# Patient Record
Sex: MALE | Race: BLACK OR AFRICAN AMERICAN | NOT HISPANIC OR LATINO | Employment: OTHER | URBAN - METROPOLITAN AREA
[De-identification: names, ages, dates, MRNs, and addresses within clinical notes are randomized per-mention and may not be internally consistent; named-entity substitution may affect disease eponyms.]

---

## 2023-03-10 DIAGNOSIS — M47.26 OSTEOARTHRITIS OF SPINE WITH RADICULOPATHY, LUMBAR REGION: Primary | ICD-10-CM

## 2023-03-10 RX ORDER — PREGABALIN 50 MG/1
50 CAPSULE ORAL 2 TIMES DAILY
Qty: 60 CAPSULE | Refills: 3 | Status: SHIPPED | OUTPATIENT
Start: 2023-03-10 | End: 2023-03-13 | Stop reason: SDUPTHER

## 2023-03-13 ENCOUNTER — TELEPHONE (OUTPATIENT)
Dept: PRIMARY CARE | Facility: CLINIC | Age: 63
End: 2023-03-13
Payer: COMMERCIAL

## 2023-03-13 RX ORDER — PREGABALIN 50 MG/1
50 CAPSULE ORAL 2 TIMES DAILY
Qty: 60 CAPSULE | Refills: 3 | Status: SHIPPED | OUTPATIENT
Start: 2023-03-13 | End: 2023-06-06

## 2023-03-28 DIAGNOSIS — M48.062 LUMBAR STENOSIS WITH NEUROGENIC CLAUDICATION: Primary | ICD-10-CM

## 2023-03-28 RX ORDER — OXYCODONE AND ACETAMINOPHEN 10; 325 MG/1; MG/1
1 TABLET ORAL 3 TIMES DAILY PRN
COMMUNITY
End: 2023-03-28 | Stop reason: SDUPTHER

## 2023-03-30 RX ORDER — OXYCODONE AND ACETAMINOPHEN 10; 325 MG/1; MG/1
1 TABLET ORAL 3 TIMES DAILY PRN
Qty: 90 TABLET | Refills: 0 | Status: SHIPPED | OUTPATIENT
Start: 2023-03-30 | End: 2023-05-02 | Stop reason: SDUPTHER

## 2023-04-12 PROBLEM — H01.003 BLEPHARITIS OF RIGHT EYE: Status: ACTIVE | Noted: 2023-04-12

## 2023-04-12 PROBLEM — I95.9 HYPOTENSION: Status: ACTIVE | Noted: 2023-04-12

## 2023-04-12 PROBLEM — K76.0 FATTY LIVER: Status: ACTIVE | Noted: 2023-04-12

## 2023-04-12 PROBLEM — I73.9 INTERMITTENT CLAUDICATION (CMS-HCC): Status: ACTIVE | Noted: 2023-04-12

## 2023-04-12 PROBLEM — K21.9 GERD (GASTROESOPHAGEAL REFLUX DISEASE): Status: ACTIVE | Noted: 2023-04-12

## 2023-04-12 PROBLEM — R26.2 INABILITY TO AMBULATE DUE TO MULTIPLE JOINTS: Status: ACTIVE | Noted: 2023-04-12

## 2023-04-12 PROBLEM — H25.11 AGE-RELATED NUCLEAR CATARACT OF RIGHT EYE: Status: ACTIVE | Noted: 2023-04-12

## 2023-04-12 PROBLEM — F41.9 ANXIETY: Status: ACTIVE | Noted: 2023-04-12

## 2023-04-12 PROBLEM — R09.81 SINUS CONGESTION: Status: ACTIVE | Noted: 2023-04-12

## 2023-04-12 PROBLEM — R79.89 ABNORMAL LFTS: Status: ACTIVE | Noted: 2023-04-12
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
## 2023-04-12 PROBLEM — N32.81 OVERACTIVE BLADDER: Status: ACTIVE | Noted: 2023-04-12

## 2023-04-12 PROBLEM — I89.8 LYMPHOCELE: Status: ACTIVE | Noted: 2023-04-12

## 2023-04-12 PROBLEM — D84.9 IMMUNOSUPPRESSION (MULTI): Status: ACTIVE | Noted: 2023-04-12

## 2023-04-12 PROBLEM — R68.89 FLU-LIKE SYMPTOMS: Status: ACTIVE | Noted: 2023-04-12

## 2023-04-12 PROBLEM — K04.7 DENTAL INFECTION: Status: ACTIVE | Noted: 2023-04-12

## 2023-04-12 PROBLEM — E78.5 DYSLIPIDEMIA: Status: ACTIVE | Noted: 2023-04-12

## 2023-04-12 PROBLEM — E21.3: Status: ACTIVE | Noted: 2023-04-12

## 2023-04-12 PROBLEM — K80.20 CHOLELITHIASIS: Status: ACTIVE | Noted: 2023-04-12

## 2023-04-12 PROBLEM — L02.222 BOIL, BACK: Status: ACTIVE | Noted: 2023-04-12

## 2023-04-12 PROBLEM — K92.1 BLOOD IN STOOL: Status: ACTIVE | Noted: 2023-04-12

## 2023-04-12 PROBLEM — K63.5 COLON POLYP: Status: ACTIVE | Noted: 2023-04-12

## 2023-04-12 PROBLEM — N52.9 INABILITY TO ATTAIN ERECTION: Status: ACTIVE | Noted: 2023-04-12

## 2023-04-12 PROBLEM — M62.81 MUSCLE WEAKNESS (GENERALIZED): Status: ACTIVE | Noted: 2023-04-12

## 2023-04-12 PROBLEM — M16.0 ARTHRITIS OF BOTH HIPS: Status: ACTIVE | Noted: 2023-04-12

## 2023-04-12 PROBLEM — I25.10 ARTERIOSCLEROSIS OF CORONARY ARTERY: Status: ACTIVE | Noted: 2023-04-12

## 2023-04-12 PROBLEM — M89.9 LYTIC LESION OF BONE ON X-RAY: Status: ACTIVE | Noted: 2023-04-12

## 2023-04-12 PROBLEM — H25.12 AGE-RELATED NUCLEAR CATARACT OF LEFT EYE: Status: ACTIVE | Noted: 2023-04-12

## 2023-04-12 PROBLEM — E11.649 DIABETIC HYPOGLYCEMIA (MULTI): Status: ACTIVE | Noted: 2023-04-12

## 2023-04-12 PROBLEM — K59.09 CHRONIC CONSTIPATION: Status: ACTIVE | Noted: 2023-04-12

## 2023-04-12 PROBLEM — L08.9 INFECTED SEBACEOUS CYST OF SKIN: Status: ACTIVE | Noted: 2023-04-12

## 2023-04-12 PROBLEM — I50.30 (HFPEF) HEART FAILURE WITH PRESERVED EJECTION FRACTION (MULTI): Status: ACTIVE | Noted: 2023-04-12

## 2023-04-12 PROBLEM — B37.9 CANDIDIASIS: Status: ACTIVE | Noted: 2023-04-12

## 2023-04-12 PROBLEM — M79.89 FINGER SWELLING: Status: ACTIVE | Noted: 2023-04-12
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
## 2023-04-12 PROBLEM — M19.019 SHOULDER ARTHRITIS: Status: ACTIVE | Noted: 2023-04-12

## 2023-04-12 PROBLEM — M79.643 HAND DISCOMFORT: Status: ACTIVE | Noted: 2023-04-12

## 2023-04-12 PROBLEM — R10.13 ABDOMINAL PAIN, EPIGASTRIC: Status: ACTIVE | Noted: 2023-04-12

## 2023-04-12 PROBLEM — I73.9 PAD (PERIPHERAL ARTERY DISEASE) (CMS-HCC): Status: ACTIVE | Noted: 2023-04-12

## 2023-04-12 PROBLEM — M25.551 RIGHT HIP PAIN: Status: ACTIVE | Noted: 2023-04-12

## 2023-04-12 PROBLEM — N25.0 RENAL OSTEODYSTROPHY: Status: ACTIVE | Noted: 2023-04-12

## 2023-04-12 PROBLEM — A08.4 VIRAL GASTROENTERITIS: Status: ACTIVE | Noted: 2023-04-12

## 2023-04-12 PROBLEM — L97.929 LEG ULCER, LEFT (MULTI): Status: ACTIVE | Noted: 2023-04-12

## 2023-04-12 PROBLEM — M48.061 LUMBAR SPINAL STENOSIS: Status: ACTIVE | Noted: 2023-04-12

## 2023-04-12 PROBLEM — R60.0 BILATERAL EDEMA OF LOWER EXTREMITY: Status: ACTIVE | Noted: 2023-04-12

## 2023-04-12 PROBLEM — M54.32 SCIATICA OF LEFT SIDE: Status: ACTIVE | Noted: 2023-04-12

## 2023-04-12 PROBLEM — R13.10 DIFFICULTY SWALLOWING: Status: ACTIVE | Noted: 2023-04-12

## 2023-04-12 PROBLEM — E87.5 HYPERKALEMIA: Status: ACTIVE | Noted: 2023-04-12

## 2023-04-12 PROBLEM — K92.2 GI BLEEDING: Status: ACTIVE | Noted: 2023-04-12

## 2023-04-12 PROBLEM — N40.0 BPH (BENIGN PROSTATIC HYPERPLASIA): Status: ACTIVE | Noted: 2023-04-12

## 2023-04-12 PROBLEM — E86.0 DEHYDRATION: Status: ACTIVE | Noted: 2023-04-12

## 2023-04-12 PROBLEM — N18.4 CHRONIC RENAL IMPAIRMENT, STAGE 4 (SEVERE) (MULTI): Status: ACTIVE | Noted: 2023-04-12

## 2023-04-12 PROBLEM — M19.90 OSTEOARTHRITIS: Status: ACTIVE | Noted: 2023-04-12

## 2023-04-12 PROBLEM — H10.13 ALLERGIC CONJUNCTIVITIS, BILATERAL: Status: ACTIVE | Noted: 2023-04-12

## 2023-04-12 PROBLEM — D12.6 TUBULAR ADENOMA OF COLON: Status: ACTIVE | Noted: 2023-04-12

## 2023-04-12 PROBLEM — G47.30 SLEEP APNEA: Status: ACTIVE | Noted: 2023-04-12

## 2023-04-12 PROBLEM — N39.0 RECURRENT UTI: Status: ACTIVE | Noted: 2023-04-12

## 2023-04-12 PROBLEM — I65.29 CAROTID ATHEROSCLEROSIS: Status: ACTIVE | Noted: 2023-04-12

## 2023-04-12 PROBLEM — R26.0 ATAXIC GAIT: Status: ACTIVE | Noted: 2023-04-12

## 2023-04-12 PROBLEM — M51.36 DEGENERATIVE LUMBAR DISC: Status: ACTIVE | Noted: 2023-04-12

## 2023-04-12 PROBLEM — Z99.2 ESRD ON DIALYSIS (MULTI): Status: ACTIVE | Noted: 2023-04-12

## 2023-04-12 PROBLEM — I50.30 DIASTOLIC HEART FAILURE (MULTI): Status: ACTIVE | Noted: 2023-04-12

## 2023-04-12 PROBLEM — H43.813 DEGENERATION OF POSTERIOR VITREOUS BODY OF BOTH EYES: Status: ACTIVE | Noted: 2023-04-12

## 2023-04-12 PROBLEM — M25.551 PAIN OF BOTH HIP JOINTS: Status: ACTIVE | Noted: 2023-04-12

## 2023-04-12 PROBLEM — T82.9XXA COMPLICATION OF ARTERIOVENOUS DIALYSIS FISTULA: Status: ACTIVE | Noted: 2023-04-12

## 2023-04-12 PROBLEM — E11.9 TYPE 2 DIABETES MELLITUS (MULTI): Status: ACTIVE | Noted: 2023-04-12

## 2023-04-12 PROBLEM — A04.72 CLOSTRIDIUM DIFFICILE ENTERITIS: Status: ACTIVE | Noted: 2023-04-12

## 2023-04-12 PROBLEM — F32.A DEPRESSION: Status: ACTIVE | Noted: 2023-04-12

## 2023-04-12 PROBLEM — M51.369 DEGENERATIVE LUMBAR DISC: Status: ACTIVE | Noted: 2023-04-12

## 2023-04-12 PROBLEM — R26.89 IMPAIRED GAIT AND MOBILITY: Status: ACTIVE | Noted: 2023-04-12

## 2023-04-12 PROBLEM — J45.909 ASTHMA (HHS-HCC): Status: ACTIVE | Noted: 2023-04-12

## 2023-04-12 PROBLEM — S91.309A NONHEALING WOUND OF HEEL: Status: ACTIVE | Noted: 2023-04-12

## 2023-04-12 PROBLEM — R05.9 COUGH: Status: ACTIVE | Noted: 2023-04-12

## 2023-04-12 PROBLEM — Z98.84 S/P GASTRIC BYPASS: Status: ACTIVE | Noted: 2023-04-12

## 2023-04-12 PROBLEM — D69.6 THROMBOCYTOPENIA (CMS-HCC): Status: ACTIVE | Noted: 2023-04-12

## 2023-04-12 PROBLEM — R19.7 DIARRHEA: Status: ACTIVE | Noted: 2023-04-12

## 2023-04-12 PROBLEM — L97.421: Status: ACTIVE | Noted: 2023-04-12

## 2023-04-12 PROBLEM — I47.10 PAROXYSMAL SVT (SUPRAVENTRICULAR TACHYCARDIA) (CMS-HCC): Status: ACTIVE | Noted: 2023-04-12

## 2023-04-12 PROBLEM — H01.006 BLEPHARITIS OF LEFT EYE: Status: ACTIVE | Noted: 2023-04-12

## 2023-04-12 PROBLEM — N39.0 URINARY TRACT INFECTION: Status: ACTIVE | Noted: 2023-04-12

## 2023-04-12 PROBLEM — M17.0 ARTHRITIS OF BOTH KNEES: Status: ACTIVE | Noted: 2023-04-12

## 2023-04-12 PROBLEM — N18.6 ESRD ON DIALYSIS (MULTI): Status: ACTIVE | Noted: 2023-04-12

## 2023-04-12 PROBLEM — R11.2 NAUSEA AND/OR VOMITING: Status: ACTIVE | Noted: 2023-04-12

## 2023-04-12 PROBLEM — M70.60 TROCHANTERIC BURSITIS: Status: ACTIVE | Noted: 2023-04-12

## 2023-04-12 PROBLEM — L72.3 INFECTED SEBACEOUS CYST OF SKIN: Status: ACTIVE | Noted: 2023-04-12

## 2023-04-12 PROBLEM — G62.9 PERIPHERAL NEUROPATHY: Status: ACTIVE | Noted: 2023-04-12

## 2023-04-12 PROBLEM — E11.3599 PROLIFERATIVE DIABETIC RETINOPATHY (MULTI): Status: ACTIVE | Noted: 2023-04-12

## 2023-04-12 PROBLEM — M54.50 LOW BACK PAIN: Status: ACTIVE | Noted: 2023-04-12

## 2023-04-12 PROBLEM — R73.9 HYPERGLYCEMIA: Status: ACTIVE | Noted: 2023-04-12

## 2023-04-12 PROBLEM — I50.9 CHF (CONGESTIVE HEART FAILURE) (MULTI): Status: ACTIVE | Noted: 2023-04-12

## 2023-04-12 PROBLEM — M16.11 ARTHRITIS OF RIGHT HIP: Status: ACTIVE | Noted: 2023-04-12

## 2023-04-12 PROBLEM — R11.0 NAUSEA: Status: ACTIVE | Noted: 2023-04-12

## 2023-04-12 PROBLEM — I35.0 AORTIC STENOSIS, MODERATE: Status: ACTIVE | Noted: 2023-04-12

## 2023-04-12 PROBLEM — T79.2XXA SEROMA DUE TO TRAUMA (CMS-HCC): Status: ACTIVE | Noted: 2023-04-12

## 2023-04-12 PROBLEM — G47.33 OSA ON CPAP: Status: ACTIVE | Noted: 2023-04-12

## 2023-04-12 PROBLEM — R33.9 URINARY RETENTION: Status: ACTIVE | Noted: 2023-04-12

## 2023-04-12 PROBLEM — J30.2 SEASONAL ALLERGIES: Status: ACTIVE | Noted: 2023-04-12

## 2023-04-12 PROBLEM — T14.8XXA WOUND, OPEN: Status: ACTIVE | Noted: 2023-04-12

## 2023-04-12 PROBLEM — I10 HYPERTENSION: Status: ACTIVE | Noted: 2023-04-12

## 2023-04-12 PROBLEM — Z86.79 HISTORY OF ATRIAL FIBRILLATION: Status: ACTIVE | Noted: 2023-04-12

## 2023-04-12 PROBLEM — R60.9 DEPENDENT EDEMA: Status: ACTIVE | Noted: 2023-04-12

## 2023-04-12 PROBLEM — M79.644 FINGER PAIN, RIGHT: Status: ACTIVE | Noted: 2023-04-12

## 2023-04-12 PROBLEM — D75.1 POLYCYTHEMIA: Status: ACTIVE | Noted: 2023-04-12

## 2023-04-12 PROBLEM — Z94.0 KIDNEY REPLACED BY TRANSPLANT (HHS-HCC): Status: ACTIVE | Noted: 2023-04-12

## 2023-04-12 PROBLEM — E66.01 MORBID OBESITY (MULTI): Status: ACTIVE | Noted: 2023-04-12

## 2023-04-12 PROBLEM — R31.9 HEMATURIA: Status: ACTIVE | Noted: 2023-04-12

## 2023-04-12 PROBLEM — D47.2 MGUS (MONOCLONAL GAMMOPATHY OF UNKNOWN SIGNIFICANCE): Status: ACTIVE | Noted: 2023-04-12

## 2023-04-12 PROBLEM — M86.9 OSTEOMYELITIS OF FOOT (MULTI): Status: ACTIVE | Noted: 2023-04-12

## 2023-04-12 PROBLEM — N31.9 BLADDER DYSFUNCTION: Status: ACTIVE | Noted: 2023-04-12

## 2023-04-12 PROBLEM — M25.541 PAIN INVOLVING JOINT OF FINGER OF RIGHT HAND: Status: ACTIVE | Noted: 2023-04-12

## 2023-04-12 PROBLEM — R00.2 HEART PALPITATIONS: Status: ACTIVE | Noted: 2023-04-12

## 2023-04-12 PROBLEM — L97.429 ULCER OF LEFT HEEL (MULTI): Status: ACTIVE | Noted: 2023-04-12

## 2023-04-12 PROBLEM — M25.552 PAIN OF BOTH HIP JOINTS: Status: ACTIVE | Noted: 2023-04-12

## 2023-04-12 PROBLEM — N18.9 CRI (CHRONIC RENAL INSUFFICIENCY): Status: ACTIVE | Noted: 2023-04-12

## 2023-04-12 PROBLEM — T86.10: Status: ACTIVE | Noted: 2023-04-12

## 2023-04-12 PROBLEM — H04.123 DRY EYE SYNDROME OF BOTH LACRIMAL GLANDS: Status: ACTIVE | Noted: 2023-04-12

## 2023-04-12 PROBLEM — M10.9 GOUT: Status: ACTIVE | Noted: 2023-04-12

## 2023-04-12 PROBLEM — R03.1 LOW BLOOD PRESSURE, NOT HYPOTENSION: Status: ACTIVE | Noted: 2023-04-12

## 2023-04-12 PROBLEM — E11.40 DIABETES MELLITUS WITH NEUROPATHY (MULTI): Status: ACTIVE | Noted: 2023-04-12

## 2023-04-12 RX ORDER — BLOOD-GLUCOSE METER
EACH MISCELLANEOUS
COMMUNITY
Start: 2015-04-02 | End: 2023-06-21 | Stop reason: SDUPTHER

## 2023-04-12 RX ORDER — FOLIC ACID/VIT B COMPLEX AND C 0.8 MG
TABLET ORAL
COMMUNITY
End: 2023-06-06

## 2023-04-12 RX ORDER — FLUCONAZOLE 100 MG/1
TABLET ORAL
COMMUNITY
Start: 2022-09-01 | End: 2023-06-06 | Stop reason: ALTCHOICE

## 2023-04-12 RX ORDER — TRIAMCINOLONE ACETONIDE 1 MG/G
CREAM TOPICAL
COMMUNITY
Start: 2022-11-05 | End: 2023-06-06

## 2023-04-12 RX ORDER — IBUPROFEN 200 MG/1
TABLET ORAL
COMMUNITY
Start: 2023-02-12

## 2023-04-12 RX ORDER — CEPHALEXIN 250 MG/1
CAPSULE ORAL EVERY 12 HOURS
COMMUNITY
Start: 2022-09-01 | End: 2023-06-06

## 2023-04-12 RX ORDER — DOXYCYCLINE 100 MG/1
1 CAPSULE ORAL 2 TIMES DAILY
COMMUNITY
Start: 2022-10-02 | End: 2023-06-06

## 2023-04-12 RX ORDER — EPINEPHRINE 0.3 MG/.3ML
0.3 INJECTION SUBCUTANEOUS AS NEEDED
COMMUNITY
Start: 2021-06-22 | End: 2023-06-29 | Stop reason: SDUPTHER

## 2023-04-12 RX ORDER — ASPIRIN 81 MG/1
1 TABLET ORAL DAILY
COMMUNITY
Start: 2015-11-05

## 2023-04-12 RX ORDER — APIXABAN 5 MG/1
1 TABLET, FILM COATED ORAL 2 TIMES DAILY
COMMUNITY
Start: 2023-02-12 | End: 2023-06-06

## 2023-04-12 RX ORDER — SEVELAMER CARBONATE 800 MG/1
800 TABLET, FILM COATED ORAL
COMMUNITY

## 2023-04-12 RX ORDER — METOPROLOL TARTRATE 25 MG/1
1 TABLET, FILM COATED ORAL 2 TIMES DAILY
COMMUNITY
Start: 2016-04-05 | End: 2023-08-15 | Stop reason: SDUPTHER

## 2023-04-12 RX ORDER — FOLIC ACID/VIT B COMPLEX AND C 0.8 MG
1 TABLET ORAL DAILY
COMMUNITY

## 2023-04-12 RX ORDER — CLOPIDOGREL BISULFATE 75 MG/1
1 TABLET ORAL DAILY
COMMUNITY

## 2023-04-12 RX ORDER — ASPIRIN 81 MG
2 TABLET, DELAYED RELEASE (ENTERIC COATED) ORAL DAILY
COMMUNITY
Start: 2023-01-19 | End: 2023-06-06 | Stop reason: ALTCHOICE

## 2023-04-12 RX ORDER — ALBUTEROL SULFATE 90 UG/1
2 AEROSOL, METERED RESPIRATORY (INHALATION)
COMMUNITY
Start: 2016-12-15

## 2023-04-12 RX ORDER — ACETAMINOPHEN 325 MG/1
TABLET ORAL
COMMUNITY
End: 2023-06-06

## 2023-04-12 RX ORDER — TORSEMIDE 100 MG/1
TABLET ORAL
COMMUNITY
End: 2023-06-06 | Stop reason: ALTCHOICE

## 2023-04-12 RX ORDER — ERGOCALCIFEROL 1.25 MG/1
CAPSULE ORAL
COMMUNITY
Start: 2021-03-28 | End: 2023-06-06

## 2023-04-12 RX ORDER — PROMETHAZINE HYDROCHLORIDE 25 MG/1
1 TABLET ORAL 3 TIMES DAILY PRN
COMMUNITY
Start: 2021-03-30 | End: 2023-06-21

## 2023-04-12 RX ORDER — NYSTATIN 100000 [USP'U]/G
1 POWDER TOPICAL 3 TIMES DAILY
COMMUNITY
Start: 2022-01-25 | End: 2023-06-06

## 2023-04-12 RX ORDER — DOXYCYCLINE 100 MG/1
100 TABLET ORAL 2 TIMES DAILY
COMMUNITY
End: 2023-06-06

## 2023-04-12 RX ORDER — PEN NEEDLE, DIABETIC 32GX 5/32"
NEEDLE, DISPOSABLE MISCELLANEOUS
COMMUNITY
Start: 2022-12-18 | End: 2023-06-29 | Stop reason: SDUPTHER

## 2023-04-12 RX ORDER — INSULIN GLARGINE 100 [IU]/ML
35 INJECTION, SOLUTION SUBCUTANEOUS 2 TIMES DAILY
COMMUNITY
Start: 2018-04-23 | End: 2023-06-29 | Stop reason: SDUPTHER

## 2023-04-12 RX ORDER — PREGABALIN 50 MG/1
50 CAPSULE ORAL 2 TIMES DAILY
COMMUNITY
Start: 2019-12-27 | End: 2023-05-18 | Stop reason: SDUPTHER

## 2023-04-12 RX ORDER — ATORVASTATIN CALCIUM 80 MG/1
1 TABLET, FILM COATED ORAL NIGHTLY
COMMUNITY
Start: 2017-07-27

## 2023-04-12 RX ORDER — PANTOPRAZOLE SODIUM 40 MG/1
1 TABLET, DELAYED RELEASE ORAL DAILY
COMMUNITY
Start: 2020-04-08 | End: 2023-06-06 | Stop reason: ALTCHOICE

## 2023-04-12 RX ORDER — LACTULOSE 10 G/15ML
SOLUTION ORAL; RECTAL
COMMUNITY

## 2023-04-12 RX ORDER — SILDENAFIL 100 MG/1
TABLET, FILM COATED ORAL
COMMUNITY
Start: 2023-01-16 | End: 2023-06-06

## 2023-04-12 RX ORDER — MULTIVITAMIN/IRON/FOLIC ACID 18MG-0.4MG
TABLET ORAL
COMMUNITY
Start: 2022-10-13 | End: 2023-06-06

## 2023-04-12 RX ORDER — RENO CAPS 100; 1.5; 1.7; 20; 10; 1; 150; 5; 6 MG/1; MG/1; MG/1; MG/1; MG/1; MG/1; UG/1; MG/1; UG/1
1 CAPSULE ORAL DAILY
COMMUNITY
Start: 2022-12-25 | End: 2023-06-06

## 2023-05-02 ENCOUNTER — TELEPHONE (OUTPATIENT)
Dept: PRIMARY CARE | Facility: CLINIC | Age: 63
End: 2023-05-02
Payer: COMMERCIAL

## 2023-05-02 DIAGNOSIS — M48.062 LUMBAR STENOSIS WITH NEUROGENIC CLAUDICATION: ICD-10-CM

## 2023-05-02 RX ORDER — OXYCODONE AND ACETAMINOPHEN 10; 325 MG/1; MG/1
1 TABLET ORAL 3 TIMES DAILY PRN
Qty: 42 TABLET | Refills: 0 | Status: SHIPPED | OUTPATIENT
Start: 2023-05-02 | End: 2023-05-18 | Stop reason: SDUPTHER

## 2023-05-02 NOTE — TELEPHONE ENCOUNTER
Pt called in and stated that he is in pain and needs his medication          RX: Oxycodone-acetaminophen           [facility] [place], [phone]              PLEASE PEND AND SEND

## 2023-05-17 ENCOUNTER — TELEPHONE (OUTPATIENT)
Dept: PRIMARY CARE | Facility: CLINIC | Age: 63
End: 2023-05-17
Payer: COMMERCIAL

## 2023-05-17 DIAGNOSIS — M48.062 LUMBAR STENOSIS WITH NEUROGENIC CLAUDICATION: ICD-10-CM

## 2023-05-18 RX ORDER — OXYCODONE AND ACETAMINOPHEN 10; 325 MG/1; MG/1
1 TABLET ORAL 3 TIMES DAILY PRN
Qty: 60 TABLET | Refills: 0 | Status: SHIPPED | OUTPATIENT
Start: 2023-05-18

## 2023-05-18 RX ORDER — PREGABALIN 50 MG/1
50 CAPSULE ORAL 2 TIMES DAILY
Qty: 40 CAPSULE | Refills: 0 | Status: SHIPPED | OUTPATIENT
Start: 2023-05-18

## 2023-05-18 NOTE — TELEPHONE ENCOUNTER
PT called and stated that he missed a call from Adan and he would like to let her know that he has appt with  Dr. Floyd Mack on June 6. Can his RX for Oxycodone and Pregabalin still get filled until he see that new PCP.   [phone]

## 2023-06-06 ENCOUNTER — OFFICE VISIT (OUTPATIENT)
Dept: PRIMARY CARE | Facility: CLINIC | Age: 63
End: 2023-06-06
Payer: COMMERCIAL

## 2023-06-06 VITALS
OXYGEN SATURATION: 98 % | DIASTOLIC BLOOD PRESSURE: 73 MMHG | WEIGHT: 315 LBS | BODY MASS INDEX: 38.36 KG/M2 | SYSTOLIC BLOOD PRESSURE: 130 MMHG | RESPIRATION RATE: 20 BRPM | HEART RATE: 67 BPM | HEIGHT: 76 IN | TEMPERATURE: 98 F

## 2023-06-06 DIAGNOSIS — R52 PAIN MANAGEMENT: ICD-10-CM

## 2023-06-06 DIAGNOSIS — I10 PRIMARY HYPERTENSION: ICD-10-CM

## 2023-06-06 DIAGNOSIS — G47.33 OBSTRUCTIVE SLEEP APNEA SYNDROME: Primary | ICD-10-CM

## 2023-06-06 DIAGNOSIS — Z79.4 TYPE 2 DIABETES MELLITUS WITH DIABETIC NEUROPATHY, WITH LONG-TERM CURRENT USE OF INSULIN (MULTI): ICD-10-CM

## 2023-06-06 DIAGNOSIS — M48.062 LUMBAR STENOSIS WITH NEUROGENIC CLAUDICATION: ICD-10-CM

## 2023-06-06 DIAGNOSIS — E11.40 TYPE 2 DIABETES MELLITUS WITH DIABETIC NEUROPATHY, WITH LONG-TERM CURRENT USE OF INSULIN (MULTI): ICD-10-CM

## 2023-06-06 PROCEDURE — 1036F TOBACCO NON-USER: CPT | Performed by: INTERNAL MEDICINE

## 2023-06-06 PROCEDURE — 3075F SYST BP GE 130 - 139MM HG: CPT | Performed by: INTERNAL MEDICINE

## 2023-06-06 PROCEDURE — 83036 HEMOGLOBIN GLYCOSYLATED A1C: CPT

## 2023-06-06 PROCEDURE — 80053 COMPREHEN METABOLIC PANEL: CPT

## 2023-06-06 PROCEDURE — 3078F DIAST BP <80 MM HG: CPT | Performed by: INTERNAL MEDICINE

## 2023-06-06 PROCEDURE — 99213 OFFICE O/P EST LOW 20 MIN: CPT | Performed by: INTERNAL MEDICINE

## 2023-06-06 PROCEDURE — 3044F HG A1C LEVEL LT 7.0%: CPT | Performed by: INTERNAL MEDICINE

## 2023-06-06 RX ORDER — PREGABALIN 25 MG/1
25 CAPSULE ORAL 2 TIMES DAILY
COMMUNITY

## 2023-06-06 SDOH — ECONOMIC STABILITY: TRANSPORTATION INSECURITY
IN THE PAST 12 MONTHS, HAS LACK OF TRANSPORTATION KEPT YOU FROM MEETINGS, WORK, OR FROM GETTING THINGS NEEDED FOR DAILY LIVING?: NO

## 2023-06-06 SDOH — ECONOMIC STABILITY: HOUSING INSECURITY
IN THE LAST 12 MONTHS, WAS THERE A TIME WHEN YOU DID NOT HAVE A STEADY PLACE TO SLEEP OR SLEPT IN A SHELTER (INCLUDING NOW)?: NO

## 2023-06-06 SDOH — ECONOMIC STABILITY: FOOD INSECURITY: WITHIN THE PAST 12 MONTHS, THE FOOD YOU BOUGHT JUST DIDN'T LAST AND YOU DIDN'T HAVE MONEY TO GET MORE.: NEVER TRUE

## 2023-06-06 SDOH — ECONOMIC STABILITY: INCOME INSECURITY: IN THE LAST 12 MONTHS, WAS THERE A TIME WHEN YOU WERE NOT ABLE TO PAY THE MORTGAGE OR RENT ON TIME?: NO

## 2023-06-06 SDOH — HEALTH STABILITY: PHYSICAL HEALTH: ON AVERAGE, HOW MANY DAYS PER WEEK DO YOU ENGAGE IN MODERATE TO STRENUOUS EXERCISE (LIKE A BRISK WALK)?: 4 DAYS

## 2023-06-06 SDOH — ECONOMIC STABILITY: FOOD INSECURITY: WITHIN THE PAST 12 MONTHS, YOU WORRIED THAT YOUR FOOD WOULD RUN OUT BEFORE YOU GOT MONEY TO BUY MORE.: NEVER TRUE

## 2023-06-06 SDOH — HEALTH STABILITY: PHYSICAL HEALTH: ON AVERAGE, HOW MANY MINUTES DO YOU ENGAGE IN EXERCISE AT THIS LEVEL?: 40 MIN

## 2023-06-06 SDOH — ECONOMIC STABILITY: TRANSPORTATION INSECURITY
IN THE PAST 12 MONTHS, HAS THE LACK OF TRANSPORTATION KEPT YOU FROM MEDICAL APPOINTMENTS OR FROM GETTING MEDICATIONS?: NO

## 2023-06-06 ASSESSMENT — LIFESTYLE VARIABLES
HOW OFTEN DO YOU HAVE SIX OR MORE DRINKS ON ONE OCCASION: NEVER
HOW OFTEN DO YOU HAVE A DRINK CONTAINING ALCOHOL: MONTHLY OR LESS
HOW MANY STANDARD DRINKS CONTAINING ALCOHOL DO YOU HAVE ON A TYPICAL DAY: 1 OR 2
SKIP TO QUESTIONS 9-10: 1
AUDIT-C TOTAL SCORE: 1

## 2023-06-06 ASSESSMENT — ANXIETY QUESTIONNAIRES
GAD7 TOTAL SCORE: 0
4. TROUBLE RELAXING: NOT AT ALL
1. FEELING NERVOUS, ANXIOUS, OR ON EDGE: NOT AT ALL
7. FEELING AFRAID AS IF SOMETHING AWFUL MIGHT HAPPEN: NOT AT ALL
2. NOT BEING ABLE TO STOP OR CONTROL WORRYING: NOT AT ALL
3. WORRYING TOO MUCH ABOUT DIFFERENT THINGS: NOT AT ALL
6. BECOMING EASILY ANNOYED OR IRRITABLE: NOT AT ALL
5. BEING SO RESTLESS THAT IT IS HARD TO SIT STILL: NOT AT ALL
IF YOU CHECKED OFF ANY PROBLEMS ON THIS QUESTIONNAIRE, HOW DIFFICULT HAVE THESE PROBLEMS MADE IT FOR YOU TO DO YOUR WORK, TAKE CARE OF THINGS AT HOME, OR GET ALONG WITH OTHER PEOPLE: NOT DIFFICULT AT ALL

## 2023-06-06 ASSESSMENT — PATIENT HEALTH QUESTIONNAIRE - PHQ9
2. FEELING DOWN, DEPRESSED OR HOPELESS: NOT AT ALL
1. LITTLE INTEREST OR PLEASURE IN DOING THINGS: NOT AT ALL
SUM OF ALL RESPONSES TO PHQ9 QUESTIONS 1 & 2: 0

## 2023-06-06 ASSESSMENT — SOCIAL DETERMINANTS OF HEALTH (SDOH)
WITHIN THE LAST YEAR, HAVE TO BEEN RAPED OR FORCED TO HAVE ANY KIND OF SEXUAL ACTIVITY BY YOUR PARTNER OR EX-PARTNER?: NO
HOW OFTEN DO YOU ATTEND CHURCH OR RELIGIOUS SERVICES?: MORE THAN 4 TIMES PER YEAR
ARE YOU MARRIED, WIDOWED, DIVORCED, SEPARATED, NEVER MARRIED, OR LIVING WITH A PARTNER?: PATIENT DECLINED
DO YOU BELONG TO ANY CLUBS OR ORGANIZATIONS SUCH AS CHURCH GROUPS UNIONS, FRATERNAL OR ATHLETIC GROUPS, OR SCHOOL GROUPS?: PATIENT DECLINED
WITHIN THE LAST YEAR, HAVE YOU BEEN AFRAID OF YOUR PARTNER OR EX-PARTNER?: NO
IN A TYPICAL WEEK, HOW MANY TIMES DO YOU TALK ON THE PHONE WITH FAMILY, FRIENDS, OR NEIGHBORS?: THREE TIMES A WEEK
HOW HARD IS IT FOR YOU TO PAY FOR THE VERY BASICS LIKE FOOD, HOUSING, MEDICAL CARE, AND HEATING?: NOT VERY HARD
HOW OFTEN DO YOU ATTENT MEETINGS OF THE CLUB OR ORGANIZATION YOU BELONG TO?: PATIENT DECLINED
HOW OFTEN DO YOU GET TOGETHER WITH FRIENDS OR RELATIVES?: THREE TIMES A WEEK
WITHIN THE LAST YEAR, HAVE YOU BEEN KICKED, HIT, SLAPPED, OR OTHERWISE PHYSICALLY HURT BY YOUR PARTNER OR EX-PARTNER?: NO
WITHIN THE LAST YEAR, HAVE YOU BEEN HUMILIATED OR EMOTIONALLY ABUSED IN OTHER WAYS BY YOUR PARTNER OR EX-PARTNER?: NO

## 2023-06-06 ASSESSMENT — COLUMBIA-SUICIDE SEVERITY RATING SCALE - C-SSRS
6. HAVE YOU EVER DONE ANYTHING, STARTED TO DO ANYTHING, OR PREPARED TO DO ANYTHING TO END YOUR LIFE?: NO
1. IN THE PAST MONTH, HAVE YOU WISHED YOU WERE DEAD OR WISHED YOU COULD GO TO SLEEP AND NOT WAKE UP?: NO
2. HAVE YOU ACTUALLY HAD ANY THOUGHTS OF KILLING YOURSELF?: NO

## 2023-06-06 ASSESSMENT — PAIN SCALES - GENERAL: PAINLEVEL: 9

## 2023-06-06 ASSESSMENT — ENCOUNTER SYMPTOMS
HYPERTENSION: 1
LOSS OF SENSATION IN FEET: 0
OCCASIONAL FEELINGS OF UNSTEADINESS: 0
DEPRESSION: 0

## 2023-06-06 NOTE — PROGRESS NOTES
"Subjective   Patient ID: Vincenzo Rivera is a 62 y.o. male who presents for Hypertension and Diabetes. Going to hemodialysis three times a week.  He has low blood pressure in dialysis but does not take meds.  The patient has had chronic back and other arthritis pain that has been difficult to control.  He reports that his prior primary care doctor was giving him opioids on a regular basis with his chronic pain, and he has a low measured for addiction.  He was looking to establish care with me and get refills for opiates from me.    Hypertension  This is a chronic problem. The problem has been gradually improving since onset.   Diabetes         Review of Systems    Objective   /73 (BP Location: Right arm, Patient Position: Sitting, BP Cuff Size: Large adult)   Pulse 67   Temp 36.7 °C (98 °F) (Temporal)   Resp 20   Ht 1.93 m (6' 4\")   Wt (!) 158 kg (349 lb)   SpO2 98%   BMI 42.48 kg/m²     Physical Exam  Constitutional:       Appearance: Normal appearance.   HENT:      Head: Normocephalic and atraumatic.   Eyes:      Extraocular Movements: Extraocular movements intact.      Pupils: Pupils are equal, round, and reactive to light.   Cardiovascular:      Rate and Rhythm: Normal rate and regular rhythm.   Pulmonary:      Effort: No respiratory distress.      Breath sounds: No wheezing.   Abdominal:      General: Bowel sounds are normal.      Palpations: Abdomen is soft.   Musculoskeletal:         General: Tenderness present.      Right lower leg: Edema present.      Left lower leg: Edema present.   Neurological:      General: No focal deficit present.      Gait: Gait abnormal.   Psychiatric:         Thought Content: Thought content normal.         Assessment/Plan   Problem List Items Addressed This Visit          Nervous    Sleep apnea - Primary       Circulatory    Hypertension     Other Visit Diagnoses       Lumbar stenosis with neurogenic claudication            I explained to the patient that it was not " my practice to prescribe pain medicines for chronic pain conditions.  I will need to send him to pain management to control this aspect of his care.  He was frustrated by that answer, but agrees to go because of his chronic pain.  Otherwise, his blood pressure has been good and we will check his screening labs for diabetes control.

## 2023-06-07 LAB
ALANINE AMINOTRANSFERASE (SGPT) (U/L) IN SER/PLAS: 12 U/L (ref 10–52)
ALBUMIN (G/DL) IN SER/PLAS: 4.1 G/DL (ref 3.4–5)
ALKALINE PHOSPHATASE (U/L) IN SER/PLAS: 107 U/L (ref 33–136)
ANION GAP IN SER/PLAS: 20 MMOL/L (ref 10–20)
ASPARTATE AMINOTRANSFERASE (SGOT) (U/L) IN SER/PLAS: 10 U/L (ref 9–39)
BILIRUBIN TOTAL (MG/DL) IN SER/PLAS: 1.2 MG/DL (ref 0–1.2)
CALCIUM (MG/DL) IN SER/PLAS: 10 MG/DL (ref 8.6–10.6)
CARBON DIOXIDE, TOTAL (MMOL/L) IN SER/PLAS: 29 MMOL/L (ref 21–32)
CHLORIDE (MMOL/L) IN SER/PLAS: 94 MMOL/L (ref 98–107)
CREATININE (MG/DL) IN SER/PLAS: 12.27 MG/DL (ref 0.5–1.3)
ESTIMATED AVERAGE GLUCOSE FOR HBA1C: 126 MG/DL
GFR MALE: 4 ML/MIN/1.73M2
GLUCOSE (MG/DL) IN SER/PLAS: 95 MG/DL (ref 74–99)
HEMOGLOBIN A1C/HEMOGLOBIN TOTAL IN BLOOD: 6 %
POTASSIUM (MMOL/L) IN SER/PLAS: 5.6 MMOL/L (ref 3.5–5.3)
PROTEIN TOTAL: 7.8 G/DL (ref 6.4–8.2)
SODIUM (MMOL/L) IN SER/PLAS: 137 MMOL/L (ref 136–145)
UREA NITROGEN (MG/DL) IN SER/PLAS: 50 MG/DL (ref 6–23)

## 2023-06-13 RX ORDER — OXYCODONE AND ACETAMINOPHEN 10; 325 MG/1; MG/1
1 TABLET ORAL 3 TIMES DAILY PRN
Qty: 60 TABLET | Refills: 0 | OUTPATIENT
Start: 2023-06-13

## 2023-06-21 DIAGNOSIS — E08.01: ICD-10-CM

## 2023-06-21 DIAGNOSIS — E08.01 DIABETES MELLITUS DUE TO UNDERLYING CONDITION WITH HYPEROSMOLAR COMA, UNSPECIFIED WHETHER LONG TERM INSULIN USE (MULTI): ICD-10-CM

## 2023-06-21 RX ORDER — BLOOD-GLUCOSE METER
1 EACH MISCELLANEOUS
Qty: 100 STRIP | Refills: 11 | Status: SHIPPED | OUTPATIENT
Start: 2023-06-21 | End: 2024-06-20

## 2023-06-21 NOTE — TELEPHONE ENCOUNTER
Patient has upcoming appt with Jeni on 07/10/23   He is on controlled substance.   Please call patient, let him know Jeni can still see him, but Jeni does not prescribe controlled substance.    Thank you!

## 2023-06-29 DIAGNOSIS — E08.00 DIABETES MELLITUS DUE TO UNDERLYING CONDITION WITH HYPEROSMOLARITY WITHOUT COMA, WITH LONG-TERM CURRENT USE OF INSULIN (MULTI): ICD-10-CM

## 2023-06-29 DIAGNOSIS — J30.2 SEASONAL ALLERGIES: ICD-10-CM

## 2023-06-29 DIAGNOSIS — Z79.4 DIABETES MELLITUS DUE TO UNDERLYING CONDITION WITH HYPEROSMOLARITY WITHOUT COMA, WITH LONG-TERM CURRENT USE OF INSULIN (MULTI): ICD-10-CM

## 2023-06-29 DIAGNOSIS — M16.0 ARTHRITIS OF BOTH HIPS: Primary | ICD-10-CM

## 2023-06-29 RX ORDER — PEN NEEDLE, DIABETIC 32GX 5/32"
NEEDLE, DISPOSABLE MISCELLANEOUS
Qty: 100 EACH | Refills: 2 | Status: SHIPPED | OUTPATIENT
Start: 2023-06-29

## 2023-06-29 RX ORDER — EPINEPHRINE 0.3 MG/.3ML
0.3 INJECTION SUBCUTANEOUS AS NEEDED
Qty: 1 EACH | Refills: 1 | Status: SHIPPED | OUTPATIENT
Start: 2023-06-29 | End: 2023-07-21

## 2023-06-29 RX ORDER — INSULIN GLARGINE 100 [IU]/ML
35 INJECTION, SOLUTION SUBCUTANEOUS 2 TIMES DAILY
Qty: 3 ML | Status: CANCELLED | OUTPATIENT
Start: 2023-06-29

## 2023-06-29 RX ORDER — INSULIN GLARGINE 100 [IU]/ML
35 INJECTION, SOLUTION SUBCUTANEOUS 2 TIMES DAILY
Qty: 3 ML | Refills: 11 | Status: SHIPPED | OUTPATIENT
Start: 2023-06-29

## 2023-07-21 DIAGNOSIS — J30.2 SEASONAL ALLERGIES: ICD-10-CM

## 2023-07-21 RX ORDER — EPINEPHRINE 1 MG/ML
1 INJECTION INTRAMUSCULAR; INTRAVENOUS; SUBCUTANEOUS ONCE
Qty: 1 ML | Refills: 3 | Status: SHIPPED | OUTPATIENT
Start: 2023-07-21 | End: 2023-07-25

## 2023-07-25 RX ORDER — EPINEPHRINE 1 MG/ML
1 INJECTION INTRAMUSCULAR; INTRAVENOUS; SUBCUTANEOUS ONCE
Qty: 1 ML | Refills: 3 | Status: SHIPPED | OUTPATIENT
Start: 2023-07-25 | End: 2023-07-25

## 2023-08-15 DIAGNOSIS — I10 PRIMARY HYPERTENSION: Primary | ICD-10-CM

## 2023-08-17 RX ORDER — METOPROLOL TARTRATE 25 MG/1
25 TABLET, FILM COATED ORAL 2 TIMES DAILY
Qty: 180 TABLET | Refills: 2 | Status: SHIPPED | OUTPATIENT
Start: 2023-08-17 | End: 2024-04-29

## 2023-10-05 ENCOUNTER — APPOINTMENT (OUTPATIENT)
Dept: PRIMARY CARE | Facility: CLINIC | Age: 63
End: 2023-10-05
Payer: COMMERCIAL

## 2023-12-15 SDOH — SOCIAL STABILITY: SOCIAL NETWORK: I HAVE TROUBLE DOING ALL OF MY USUAL WORK (INCLUDE WORK AT HOME): SOMETIMES

## 2023-12-15 SDOH — SOCIAL STABILITY: SOCIAL NETWORK: I HAVE TROUBLE DOING ALL OF THE ACTIVITIES WITH FRIENDS THAT I WANT TO DO: SOMETIMES

## 2023-12-15 SDOH — SOCIAL STABILITY: SOCIAL NETWORK

## 2023-12-15 SDOH — SOCIAL STABILITY: SOCIAL NETWORK: I HAVE TROUBLE DOING ALL OF MY REGULAR LEISURE ACTIVITIES WITH OTHERS: SOMETIMES

## 2023-12-15 SDOH — SOCIAL STABILITY: SOCIAL NETWORK: I HAVE TROUBLE DOING ALL OF THE FAMILY ACTIVITIES THAT I WANT TO DO: SOMETIMES

## 2023-12-15 SDOH — SOCIAL STABILITY: SOCIAL NETWORK: PROMIS ABILITY TO PARTICIPATE IN SOCIAL ROLES & ACTIVITIES T-SCORE: 45

## 2023-12-15 ASSESSMENT — PROMIS GLOBAL HEALTH SCALE
RATE_AVERAGE_PAIN: 8
RATE_SOCIAL_SATISFACTION: VERY GOOD
RATE_QUALITY_OF_LIFE: VERY GOOD
CARRYOUT_SOCIAL_ACTIVITIES: FAIR
RATE_GENERAL_HEALTH: FAIR
CARRYOUT_PHYSICAL_ACTIVITIES: A LITTLE
RATE_MENTAL_HEALTH: VERY GOOD
RATE_PHYSICAL_HEALTH: FAIR
EMOTIONAL_PROBLEMS: RARELY

## 2023-12-21 ENCOUNTER — APPOINTMENT (OUTPATIENT)
Dept: INTEGRATIVE MEDICINE | Facility: CLINIC | Age: 63
End: 2023-12-21
Payer: COMMERCIAL

## 2024-01-08 ENCOUNTER — APPOINTMENT (OUTPATIENT)
Dept: CARDIOLOGY | Facility: CLINIC | Age: 64
End: 2024-01-08
Payer: COMMERCIAL

## 2024-01-17 ENCOUNTER — APPOINTMENT (OUTPATIENT)
Dept: CARDIOLOGY | Facility: HOSPITAL | Age: 64
End: 2024-01-17
Payer: COMMERCIAL

## 2024-01-24 ENCOUNTER — TELEPHONE (OUTPATIENT)
Dept: SURGERY | Facility: CLINIC | Age: 64
End: 2024-01-24
Payer: COMMERCIAL

## 2024-01-24 NOTE — TELEPHONE ENCOUNTER
Spoke with patient , he states that his PCP told him to call Dr. Mercedes. Patient complains of pain right shoulder blade, intermittent for the last 4 months. Patient denies nausea or vomiting. Patient states that the pain is not bad enough to go to ED.   S/P 6/2021  Jennifer Pichardo.  Please advise.

## 2024-03-16 ENCOUNTER — LAB (OUTPATIENT)
Dept: LAB | Facility: LAB | Age: 64
End: 2024-03-16
Payer: COMMERCIAL

## 2024-03-16 DIAGNOSIS — R10.84 GENERALIZED ABDOMINAL PAIN: ICD-10-CM

## 2024-03-16 DIAGNOSIS — M51.36 OTHER INTERVERTEBRAL DISC DEGENERATION, LUMBAR REGION: ICD-10-CM

## 2024-03-16 DIAGNOSIS — F11.20 OPIOID DEPENDENCE, UNCOMPLICATED (MULTI): Primary | ICD-10-CM

## 2024-03-16 LAB
ALBUMIN SERPL BCP-MCNC: 4.1 G/DL (ref 3.4–5)
ALP SERPL-CCNC: 127 U/L (ref 33–136)
ALT SERPL W P-5'-P-CCNC: 76 U/L (ref 10–52)
ANION GAP SERPL CALC-SCNC: 17 MMOL/L (ref 10–20)
APAP SERPL-MCNC: <10 UG/ML
AST SERPL W P-5'-P-CCNC: 74 U/L (ref 9–39)
BASOPHILS # BLD AUTO: 0.03 X10*3/UL (ref 0–0.1)
BASOPHILS NFR BLD AUTO: 0.5 %
BILIRUB SERPL-MCNC: 1 MG/DL (ref 0–1.2)
BUN SERPL-MCNC: 27 MG/DL (ref 6–23)
CALCIUM SERPL-MCNC: 9.8 MG/DL (ref 8.6–10.6)
CHLORIDE SERPL-SCNC: 97 MMOL/L (ref 98–107)
CO2 SERPL-SCNC: 31 MMOL/L (ref 21–32)
CREAT SERPL-MCNC: 7.08 MG/DL (ref 0.5–1.3)
EGFRCR SERPLBLD CKD-EPI 2021: 8 ML/MIN/1.73M*2
EOSINOPHIL # BLD AUTO: 0.07 X10*3/UL (ref 0–0.7)
EOSINOPHIL NFR BLD AUTO: 1.2 %
ERYTHROCYTE [DISTWIDTH] IN BLOOD BY AUTOMATED COUNT: 13.8 % (ref 11.5–14.5)
ETHANOL SERPL-MCNC: <10 MG/DL
GLUCOSE SERPL-MCNC: 95 MG/DL (ref 74–99)
HCT VFR BLD AUTO: 46.4 % (ref 41–52)
HGB BLD-MCNC: 15.3 G/DL (ref 13.5–17.5)
IMM GRANULOCYTES # BLD AUTO: 0.01 X10*3/UL (ref 0–0.7)
IMM GRANULOCYTES NFR BLD AUTO: 0.2 % (ref 0–0.9)
LYMPHOCYTES # BLD AUTO: 1.22 X10*3/UL (ref 1.2–4.8)
LYMPHOCYTES NFR BLD AUTO: 21.7 %
MCH RBC QN AUTO: 31.7 PG (ref 26–34)
MCHC RBC AUTO-ENTMCNC: 33 G/DL (ref 32–36)
MCV RBC AUTO: 96 FL (ref 80–100)
MONOCYTES # BLD AUTO: 0.67 X10*3/UL (ref 0.1–1)
MONOCYTES NFR BLD AUTO: 11.9 %
NEUTROPHILS # BLD AUTO: 3.63 X10*3/UL (ref 1.2–7.7)
NEUTROPHILS NFR BLD AUTO: 64.5 %
NRBC BLD-RTO: 0 /100 WBCS (ref 0–0)
PLATELET # BLD AUTO: 111 X10*3/UL (ref 150–450)
POTASSIUM SERPL-SCNC: 4 MMOL/L (ref 3.5–5.3)
PROT SERPL-MCNC: 7.3 G/DL (ref 6.4–8.2)
RBC # BLD AUTO: 4.82 X10*6/UL (ref 4.5–5.9)
SALICYLATES SERPL-MCNC: <3 MG/DL
SODIUM SERPL-SCNC: 141 MMOL/L (ref 136–145)
WBC # BLD AUTO: 5.6 X10*3/UL (ref 4.4–11.3)

## 2024-03-16 PROCEDURE — 36415 COLL VENOUS BLD VENIPUNCTURE: CPT

## 2024-03-16 PROCEDURE — 80179 DRUG ASSAY SALICYLATE: CPT

## 2024-03-16 PROCEDURE — 80320 DRUG SCREEN QUANTALCOHOLS: CPT

## 2024-03-16 PROCEDURE — 85025 COMPLETE CBC W/AUTO DIFF WBC: CPT

## 2024-03-16 PROCEDURE — 80143 DRUG ASSAY ACETAMINOPHEN: CPT

## 2024-03-16 PROCEDURE — 80053 COMPREHEN METABOLIC PANEL: CPT

## 2024-04-28 DIAGNOSIS — I10 PRIMARY HYPERTENSION: ICD-10-CM

## 2024-04-29 RX ORDER — METOPROLOL TARTRATE 25 MG/1
25 TABLET, FILM COATED ORAL 2 TIMES DAILY
Qty: 180 TABLET | Refills: 2 | Status: SHIPPED | OUTPATIENT
Start: 2024-04-29

## 2024-07-07 DIAGNOSIS — E08.01: ICD-10-CM

## 2024-07-08 RX ORDER — BLOOD-GLUCOSE METER
EACH MISCELLANEOUS
Qty: 100 STRIP | Refills: 11 | Status: SHIPPED | OUTPATIENT
Start: 2024-07-08 | End: 2025-07-08

## 2024-12-26 ENCOUNTER — OFFICE VISIT (OUTPATIENT)
Dept: SURGERY | Facility: CLINIC | Age: 64
End: 2024-12-26
Payer: COMMERCIAL

## 2024-12-26 VITALS
BODY MASS INDEX: 38.36 KG/M2 | SYSTOLIC BLOOD PRESSURE: 162 MMHG | DIASTOLIC BLOOD PRESSURE: 64 MMHG | WEIGHT: 315 LBS | HEART RATE: 97 BPM | HEIGHT: 76 IN

## 2024-12-26 DIAGNOSIS — L72.0 INFECTED EPIDERMOID CYST: Primary | ICD-10-CM

## 2024-12-26 DIAGNOSIS — L08.9 INFECTED EPIDERMOID CYST: Primary | ICD-10-CM

## 2024-12-26 PROCEDURE — 3077F SYST BP >= 140 MM HG: CPT | Performed by: SURGERY

## 2024-12-26 PROCEDURE — 99203 OFFICE O/P NEW LOW 30 MIN: CPT | Performed by: SURGERY

## 2024-12-26 PROCEDURE — 3078F DIAST BP <80 MM HG: CPT | Performed by: SURGERY

## 2024-12-26 PROCEDURE — 3008F BODY MASS INDEX DOCD: CPT | Performed by: SURGERY

## 2024-12-26 PROCEDURE — 99213 OFFICE O/P EST LOW 20 MIN: CPT | Performed by: SURGERY

## 2024-12-26 PROCEDURE — 1036F TOBACCO NON-USER: CPT | Performed by: SURGERY

## 2024-12-26 ASSESSMENT — PAIN SCALES - GENERAL: PAINLEVEL_OUTOF10: 0-NO PAIN

## 2024-12-26 ASSESSMENT — ENCOUNTER SYMPTOMS
DEPRESSION: 0
LOSS OF SENSATION IN FEET: 0
OCCASIONAL FEELINGS OF UNSTEADINESS: 0

## 2024-12-26 NOTE — PROGRESS NOTES
History Of Present Illness  Vincenzo Rivera is a 64 y.o. male presenting with long history of recurrent abscesses related to sebaceous cyst on his back.  He has had several removed over the years.  I apparently took his gallbladder out a number of years ago.  He has a history of end-stage renal disease with Tuesday Thursday Saturday dialysis.  History of CAD with cardiac stents.  He is on a baby aspirin and Plavix.  He also is undergoing evaluation for redo bariatric surgery.     Past Medical History  Past Medical History:   Diagnosis Date    Acute embolism and thrombosis of unspecified deep veins of unspecified lower extremity (Multi) 12/17/2018    Acute deep vein thrombosis (DVT) of other vein of lower extremity    Chronic obstructive pulmonary disease with (acute) exacerbation (Multi)     Obstructive chronic bronchitis with exacerbation    Cortical age-related cataract, left eye 12/07/2015    Cortical age-related cataract of left eye    Cortical age-related cataract, right eye 12/07/2015    Cortical age-related cataract of right eye    Dry eye syndrome of right lacrimal gland 07/28/2014    Dry eye syndrome of right lacrimal gland    Enterocolitis due to Clostridium difficile, not specified as recurrent     Clostridium difficile enteritis    Gastro-esophageal reflux disease with esophagitis, without bleeding 05/16/2017    Gastro-esophageal reflux disease with esophagitis    Hereditary and idiopathic neuropathy, unspecified     Idiopathic peripheral neuropathy    Kidney transplant status ([facility]) 10/20/2022    Kidney replaced by transplant    Non-pressure chronic ulcer of other part of left foot with unspecified severity (Multi) 10/20/2021    Foot ulcer, left    Non-pressure chronic ulcer of unspecified heel and midfoot with unspecified severity (Multi) 10/06/2021    Heel ulcer    Other allergy status, other than to drugs and biological substances 09/06/2016    Environmental allergies    Other long term (current)  drug therapy 03/20/2017    High risk medication use    Other viral infections of unspecified site 03/20/2017    BK viremia    Personal history of diseases of the blood and blood-forming organs and certain disorders involving the immune mechanism 05/14/2021    History of anemia    Personal history of other diseases of the circulatory system     History of cardiomegaly    Personal history of other diseases of the circulatory system 10/05/2015    History of hypertension    Personal history of other diseases of the nervous system and sense organs 03/11/2021    History of obstructive sleep apnea    Personal history of other diseases of urinary system 07/02/2021    History of end stage renal disease    Personal history of other endocrine, nutritional and metabolic disease 05/18/2020    History of obesity    Personal history of other endocrine, nutritional and metabolic disease 07/20/2016    History of diabetes mellitus    Personal history of other specified conditions 08/12/2021    History of gait disorder    Personal history of other venous thrombosis and embolism 10/30/2017    History of deep venous thrombosis    Proteinuria, unspecified     Proteinuria    Type 2 diabetes mellitus with diabetic neuropathy, unspecified (Multi) 10/20/2021    Controlled diabetes mellitus with diabetic neuropathy    Unspecified complication of kidney transplant ([facility]) 08/24/2016    Complications, kidney transplant    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    Vitreous degeneration, bilateral 07/28/2014    Degeneration of posterior vitreous body of both eyes    Vitreous degeneration, right eye 07/28/2014    Vitreous degeneration of right eye       Surgical History  Past Surgical History:   Procedure Laterality Date    CT GUIDED PERCUTANEOUS BIOPSY MEDIASTINUM  8/17/2015    CT GUIDED PERCUTANEOUS BIOPSY MEDIASTINUM 8/17/2015 [facility] AIB LEGACY    INCISION AND DRAINAGE OF WOUND  05/19/2016    Incision And Drainage Of Skin Abscess    IR GI  TUBE EXCHANGE  9/14/2015    IR GI TUBE EXCHANGE 9/14/2015 [facility] CLINICAL LEGACY    IR GI TUBE EXCHANGE  10/18/2015    IR GI TUBE EXCHANGE 10/18/2015 [facility] INPATIENT LEGACY    IR GI TUBE EXCHANGE  10/19/2015    IR GI TUBE EXCHANGE 10/19/2015 [facility] INPATIENT LEGACY    IR NEPHROSTOMY TUBE EXCHANGE  9/14/2015    IR NEPHROSTOMY TUBE EXCHANGE 9/14/2015 [facility] CLINICAL LEGACY    IR NEPHROSTOMY TUBE EXCHANGE  10/18/2015    IR NEPHROSTOMY TUBE EXCHANGE 10/18/2015 [facility] INPATIENT LEGACY    IR NEPHROSTOMY TUBE EXCHANGE  10/19/2015    IR NEPHROSTOMY TUBE EXCHANGE 10/19/2015 [facility] INPATIENT LEGACY    IR VENOGRAM DIALYSIS  3/17/2021    IR VENOGRAM DIALYSIS 3/17/2021 [facility] CLINICAL LEGACY    OTHER SURGICAL HISTORY  11/11/2021    Arteriovenous fistula creation procedure    OTHER SURGICAL HISTORY  10/05/2015    Arteriovenous Surgery Open Revision Of AV Fistula With Thrombectomy    OTHER SURGICAL HISTORY  01/06/2016    Ureterostomy Revision    OTHER SURGICAL HISTORY  10/05/2015    Renal Transplant RLQ    OTHER SURGICAL HISTORY  10/05/2015    Gastric Surgery For Morbid Obesity Laparoscopic Longitudinal Gastrectomy    UMBILICAL HERNIA REPAIR  10/05/2015    Umbilical Hernia Repair    US GUIDED ABSCESS DRAIN  4/20/2015    US GUIDED ABSCESS DRAIN 4/20/2015 [facility] ANCILLARY LEGACY    US GUIDED ABSCESS DRAIN  6/8/2015    US GUIDED ABSCESS DRAIN 6/8/2015 [facility] AIB LEGACY    US GUIDED ABSCESS DRAIN  7/31/2015    US GUIDED ABSCESS DRAIN 7/31/2015 [facility] AIB LEGACY    US GUIDED ASPIRATION OF ABSCESS, HEMATOMA, CYST  3/16/2015    US GUIDED ASPIRATION OF ABSCESS, HEMATOMA, CYST 3/16/2015 [facility] AIB LEGACY    US GUIDED ASPIRATION OF ABSCESS, HEMATOMA, CYST  6/8/2015    US GUIDED ASPIRATION OF ABSCESS, HEMATOMA, CYST 6/8/2015 [facility] AIB LEGACY    US GUIDED PERCUTANEOUS PERITONEAL OR RETROPERITONEAL FLUID COLLECTION DRAINAGE  4/20/2015    US GUIDED PERCUTANEOUS PERITONEAL OR RETROPERITONEAL FLUID COLLECTION DRAINAGE 4/20/2015 [facility] ANCILLARY LEGACY    US GUIDED PERCUTANEOUS  PERITONEAL OR RETROPERITONEAL FLUID COLLECTION DRAINAGE  7/31/2015    US GUIDED PERCUTANEOUS PERITONEAL OR RETROPERITONEAL FLUID COLLECTION DRAINAGE 7/31/2015 [facility] AIB LEGAC[facility]        Social History  He reports that he has never smoked. He has never used smokeless tobacco. He reports current alcohol use. Drug use questions deferred to the physician.    Family History  No family history on file.     Allergies  Ketorolac, Shellfish containing products, and Amlodipine    Review of Systems  Constitutional: no weight loss, no fevers, no malaise  HEENT: negative  Neck: negative  Pulmonary: no SOB, no cough  CV: No angina.  Does have history of CAD  GI: no pain, no diarrhea, no bloody stools, no constipation  : Dialysis patient  MS: no aches/pains  Neurologic: negative  Skin: no rashes, lesions  HEME: Takes Plavix  Psych: no mood issues    Physical Exam  General: well appearing, no acute distress, well nourished  HEENT: normal  Neck: supple  Pulmonary: lungs clear to auscultation bilaterally  CV: RR, S1S2, no murmurs.  Pulses palpable and equal.  Good capillary refill  Abdomen: soft, non tender, no masses  : grossly normal external genitalia  MS: grossly normal  Neurologic: alert and oriented, strength/sensation intact  Skin: Left of midline in the upper lumbar back there is a recently ruptured epidermoid cyst.  There is also lipomatous mass above this more toward the midline under an old scar.  He also has an epidermoid cyst left upper back  Psych: mood appropriate    Last Recorded Vitals  There were no vitals taken for this visit.    Relevant Results       Assessment/Plan       Patient with at least 2 epidermoid cyst on his back that have given him problems with infections over the years.  He also has a probable recurrent lipoma in the midline back.  We reviewed surgical treatment options for these which would be wide surgical excision.  Risks and benefits detailed.  He would need to stop his Plavix prior to the  procedure.  Given his health history this would also need to be scheduled at [facility].  He wishes to proceed.  This will be scheduled at his convenience       I spent 30 minutes in the professional and overall care of this patient.      Rubens Mercedes MD

## 2024-12-26 NOTE — LETTER
December 26, 2024     Dylon Manzo MD  [id number]  [facility] [address]    Patient: Vincenzo Rivera   YOB: 1960   Date of Visit: 12/26/2024       Dear Dr. Dylon Manzo MD:    Thank you for referring Vincenzo Rivera to me for evaluation. Below are my notes for this consultation.  If you have questions, please do not hesitate to call me. I look forward to following your patient along with you.       Sincerely,     Rubens Mercedes MD      CC: No Recipients  ______________________________________________________________________________________    History Of Present Illness  Vincenzo Rivera is a 64 y.o. male presenting with long history of recurrent abscesses related to sebaceous cyst on his back.  He has had several removed over the years.  I apparently took his gallbladder out a number of years ago.  He has a history of end-stage renal disease with Tuesday Thursday Saturday dialysis.  History of CAD with cardiac stents.  He is on a baby aspirin and Plavix.  He also is undergoing evaluation for redo bariatric surgery.     Past Medical History  Past Medical History:   Diagnosis Date   • Acute embolism and thrombosis of unspecified deep veins of unspecified lower extremity (Multi) 12/17/2018    Acute deep vein thrombosis (DVT) of other vein of lower extremity   • Chronic obstructive pulmonary disease with (acute) exacerbation (Multi)     Obstructive chronic bronchitis with exacerbation   • Cortical age-related cataract, left eye 12/07/2015    Cortical age-related cataract of left eye   • Cortical age-related cataract, right eye 12/07/2015    Cortical age-related cataract of right eye   • Dry eye syndrome of right lacrimal gland 07/28/2014    Dry eye syndrome of right lacrimal gland   • Enterocolitis due to Clostridium difficile, not specified as recurrent     Clostridium difficile enteritis   • Gastro-esophageal reflux disease with esophagitis, without bleeding  05/16/2017    Gastro-esophageal reflux disease with esophagitis   • Hereditary and idiopathic neuropathy, unspecified     Idiopathic peripheral neuropathy   • Kidney transplant status ([facility]) 10/20/2022    Kidney replaced by transplant   • Non-pressure chronic ulcer of other part of left foot with unspecified severity (Multi) 10/20/2021    Foot ulcer, left   • Non-pressure chronic ulcer of unspecified heel and midfoot with unspecified severity (Multi) 10/06/2021    Heel ulcer   • Other allergy status, other than to drugs and biological substances 09/06/2016    Environmental allergies   • Other long term (current) drug therapy 03/20/2017    High risk medication use   • Other viral infections of unspecified site 03/20/2017    BK viremia   • Personal history of diseases of the blood and blood-forming organs and certain disorders involving the immune mechanism 05/14/2021    History of anemia   • Personal history of other diseases of the circulatory system     History of cardiomegaly   • Personal history of other diseases of the circulatory system 10/05/2015    History of hypertension   • Personal history of other diseases of the nervous system and sense organs 03/11/2021    History of obstructive sleep apnea   • Personal history of other diseases of urinary system 07/02/2021    History of end stage renal disease   • Personal history of other endocrine, nutritional and metabolic disease 05/18/2020    History of obesity   • Personal history of other endocrine, nutritional and metabolic disease 07/20/2016    History of diabetes mellitus   • Personal history of other specified conditions 08/12/2021    History of gait disorder   • Personal history of other venous thrombosis and embolism 10/30/2017    History of deep venous thrombosis   • Proteinuria, unspecified     Proteinuria   • Type 2 diabetes mellitus with diabetic neuropathy, unspecified (Multi) 10/20/2021    Controlled diabetes mellitus with diabetic neuropathy   •  Unspecified complication of kidney transplant ([facility]) 08/24/2016    Complications, kidney transplant   • 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   • Vitreous degeneration, bilateral 07/28/2014    Degeneration of posterior vitreous body of both eyes   • Vitreous degeneration, right eye 07/28/2014    Vitreous degeneration of right eye       Surgical History  Past Surgical History:   Procedure Laterality Date   • CT GUIDED PERCUTANEOUS BIOPSY MEDIASTINUM  8/17/2015    CT GUIDED PERCUTANEOUS BIOPSY MEDIASTINUM 8/17/2015 [facility]   • INCISION AND DRAINAGE OF WOUND  05/19/2016    Incision And Drainage Of Skin Abscess   • IR GI TUBE EXCHANGE  9/14/2015    IR GI TUBE EXCHANGE 9/14/2015 [facility] CLINICAL LEGACY   • IR GI TUBE EXCHANGE  10/18/2015    IR GI TUBE EXCHANGE 10/18/2015 [facility]   • IR GI TUBE EXCHANGE  10/19/2015    IR GI TUBE EXCHANGE 10/19/2015 [facility]   • IR NEPHROSTOMY TUBE EXCHANGE  9/14/2015    IR NEPHROSTOMY TUBE EXCHANGE 9/14/2015 [facility] CLINICAL LEGACY   • IR NEPHROSTOMY TUBE EXCHANGE  10/18/2015    IR NEPHROSTOMY TUBE EXCHANGE 10/18/2015 [facility]   • IR NEPHROSTOMY TUBE EXCHANGE  10/19/2015    IR NEPHROSTOMY TUBE EXCHANGE 10/19/2015 [facility]   • IR VENOGRAM DIALYSIS  3/17/2021    IR VENOGRAM DIALYSIS 3/17/2021 [facility] CLINICAL LEGACY   • OTHER SURGICAL HISTORY  11/11/2021    Arteriovenous fistula creation procedure   • OTHER SURGICAL HISTORY  10/05/2015    Arteriovenous Surgery Open Revision Of AV Fistula With Thrombectomy   • OTHER SURGICAL HISTORY  01/06/2016    Ureterostomy Revision   • OTHER SURGICAL HISTORY  10/05/2015    Renal Transplant RLQ   • OTHER SURGICAL HISTORY  10/05/2015    Gastric Surgery For Morbid Obesity Laparoscopic Longitudinal Gastrectomy   • UMBILICAL HERNIA REPAIR  10/05/2015    Umbilical Hernia Repair   • US GUIDED ABSCESS DRAIN  4/20/2015    US GUIDED ABSCESS DRAIN 4/20/2015 [facility]CILLARY LEGAC[facility]   • US GUIDED ABSCESS DRAIN   6/8/2015    US GUIDED ABSCESS DRAIN 6/8/2015 [facility] AIB LEGACY   • US GUIDED ABSCESS DRAIN  7/31/2015    US GUIDED ABSCESS DRAIN 7/31/2015 [facility] AIB LEGACY   • US GUIDED ASPIRATION OF ABSCESS, HEMATOMA, CYST  3/16/2015    US GUIDED ASPIRATION OF ABSCESS, HEMATOMA, CYST 3/16/2015 [facility] AIB LEGACY   • US GUIDED ASPIRATION OF ABSCESS, HEMATOMA, CYST  6/8/2015    US GUIDED ASPIRATION OF ABSCESS, HEMATOMA, CYST 6/8/2015 [facility] AIB LEGACY   • US GUIDED PERCUTANEOUS PERITONEAL OR RETROPERITONEAL FLUID COLLECTION DRAINAGE  4/20/2015    US GUIDED PERCUTANEOUS PERITONEAL OR RETROPERITONEAL FLUID COLLECTION DRAINAGE 4/20/2015 [facility] ANCILLARY LEGACY   • US GUIDED PERCUTANEOUS PERITONEAL OR RETROPERITONEAL FLUID COLLECTION DRAINAGE  7/31/2015    US GUIDED PERCUTANEOUS PERITONEAL OR RETROPERITONEAL FLUID COLLECTION DRAINAGE 7/31/2015 [facility] AIB LEGACY        Social History  He reports that he has never smoked. He has never used smokeless tobacco. He reports current alcohol use. Drug use questions deferred to the physician.    Family History  No family history on file.     Allergies  Ketorolac, Shellfish containing products, and Amlodipine    Review of Systems  Constitutional: no weight loss, no fevers, no malaise  HEENT: negative  Neck: negative  Pulmonary: no SOB, no cough  CV: No angina.  Does have history of CAD  GI: no pain, no diarrhea, no bloody stools, no constipation  : Dialysis patient  MS: no aches/pains  Neurologic: negative  Skin: no rashes, lesions  HEME: Takes Plavix  Psych: no mood issues    Physical Exam  General: well appearing, no acute distress, well nourished  HEENT: normal  Neck: supple  Pulmonary: lungs clear to auscultation bilaterally  CV: RR, S1S2, no murmurs.  Pulses palpable and equal.  Good capillary refill  Abdomen: soft, non tender, no masses  : grossly normal external genitalia  MS: grossly normal  Neurologic: alert and oriented, strength/sensation intact  Skin: Left of midline in the upper lumbar back there  is a recently ruptured epidermoid cyst.  There is also lipomatous mass above this more toward the midline under an old scar.  He also has an epidermoid cyst left upper back  Psych: mood appropriate    Last Recorded Vitals  There were no vitals taken for this visit.    Relevant Results       Assessment/Plan      Patient with at least 2 epidermoid cyst on his back that have given him problems with infections over the years.  He also has a probable recurrent lipoma in the midline back.  We reviewed surgical treatment options for these which would be wide surgical excision.  Risks and benefits detailed.  He would need to stop his Plavix prior to the procedure.  Given his health history this would also need to be scheduled at [facility].  He wishes to proceed.  This will be scheduled at his convenience       I spent 30 minutes in the professional and overall care of this patient.      Rubens Mercedes MD

## 2024-12-31 PROBLEM — L08.9 INFECTED EPIDERMOID CYST: Status: ACTIVE | Noted: 2024-12-26
